# Patient Record
Sex: MALE | Race: WHITE | Employment: OTHER | URBAN - METROPOLITAN AREA
[De-identification: names, ages, dates, MRNs, and addresses within clinical notes are randomized per-mention and may not be internally consistent; named-entity substitution may affect disease eponyms.]

---

## 2017-01-03 ENCOUNTER — ANTI-COAG VISIT (OUTPATIENT)
Dept: PHARMACY | Facility: CLINIC | Age: 82
End: 2017-01-03

## 2017-01-03 LAB — INR BLD: 2.8

## 2017-02-03 ENCOUNTER — ANTI-COAG VISIT (OUTPATIENT)
Dept: PHARMACY | Facility: CLINIC | Age: 82
End: 2017-02-03

## 2017-02-03 LAB — INR BLD: 2.3

## 2017-03-07 ENCOUNTER — ANTI-COAG VISIT (OUTPATIENT)
Dept: PHARMACY | Facility: CLINIC | Age: 82
End: 2017-03-07

## 2017-03-07 LAB — INR BLD: 3

## 2017-04-04 ENCOUNTER — ANTI-COAG VISIT (OUTPATIENT)
Dept: PHARMACY | Facility: CLINIC | Age: 82
End: 2017-04-04

## 2017-04-04 LAB — INR BLD: 3.3

## 2017-04-24 ENCOUNTER — TELEPHONE (OUTPATIENT)
Dept: CARDIOLOGY CLINIC | Age: 82
End: 2017-04-24

## 2017-04-24 RX ORDER — WARFARIN SODIUM 4 MG/1
TABLET ORAL
Qty: 125 TABLET | Refills: 1 | Status: SHIPPED | OUTPATIENT
Start: 2017-04-24 | End: 2017-08-14 | Stop reason: SDUPTHER

## 2017-05-01 ENCOUNTER — ANTI-COAG VISIT (OUTPATIENT)
Dept: PHARMACY | Facility: CLINIC | Age: 82
End: 2017-05-01

## 2017-05-01 LAB — INR BLD: 2.3

## 2017-06-01 ENCOUNTER — ANTI-COAG VISIT (OUTPATIENT)
Dept: PHARMACY | Facility: CLINIC | Age: 82
End: 2017-06-01

## 2017-06-01 LAB — INR BLD: 2.9

## 2017-06-19 ENCOUNTER — PROCEDURE VISIT (OUTPATIENT)
Dept: CARDIOLOGY CLINIC | Age: 82
End: 2017-06-19

## 2017-06-19 ENCOUNTER — OFFICE VISIT (OUTPATIENT)
Dept: CARDIOLOGY CLINIC | Age: 82
End: 2017-06-19

## 2017-06-19 VITALS
SYSTOLIC BLOOD PRESSURE: 136 MMHG | HEART RATE: 68 BPM | BODY MASS INDEX: 27.7 KG/M2 | HEIGHT: 69 IN | OXYGEN SATURATION: 97 % | WEIGHT: 187 LBS | DIASTOLIC BLOOD PRESSURE: 78 MMHG

## 2017-06-19 DIAGNOSIS — I25.10 ATHEROSCLEROSIS OF NATIVE CORONARY ARTERY OF NATIVE HEART WITHOUT ANGINA PECTORIS: ICD-10-CM

## 2017-06-19 DIAGNOSIS — Z95.0 CARDIAC PACEMAKER IN SITU: ICD-10-CM

## 2017-06-19 DIAGNOSIS — E78.2 MIXED HYPERLIPIDEMIA: ICD-10-CM

## 2017-06-19 DIAGNOSIS — I49.5 SSS (SICK SINUS SYNDROME) (HCC): ICD-10-CM

## 2017-06-19 DIAGNOSIS — I48.0 PAROXYSMAL ATRIAL FIBRILLATION (HCC): Primary | ICD-10-CM

## 2017-06-19 DIAGNOSIS — I44.30 AVB (ATRIOVENTRICULAR BLOCK): ICD-10-CM

## 2017-06-19 DIAGNOSIS — I10 ESSENTIAL HYPERTENSION, BENIGN: ICD-10-CM

## 2017-06-19 DIAGNOSIS — R53.82 CHRONIC FATIGUE: ICD-10-CM

## 2017-06-19 DIAGNOSIS — Z95.0 CARDIAC PACEMAKER IN SITU: Primary | ICD-10-CM

## 2017-06-19 PROCEDURE — G8419 CALC BMI OUT NRM PARAM NOF/U: HCPCS | Performed by: INTERNAL MEDICINE

## 2017-06-19 PROCEDURE — G8427 DOCREV CUR MEDS BY ELIG CLIN: HCPCS | Performed by: INTERNAL MEDICINE

## 2017-06-19 PROCEDURE — 93279 PRGRMG DEV EVAL PM/LDLS PM: CPT | Performed by: INTERNAL MEDICINE

## 2017-06-19 PROCEDURE — 4040F PNEUMOC VAC/ADMIN/RCVD: CPT | Performed by: INTERNAL MEDICINE

## 2017-06-19 PROCEDURE — G8598 ASA/ANTIPLAT THER USED: HCPCS | Performed by: INTERNAL MEDICINE

## 2017-06-19 PROCEDURE — 1123F ACP DISCUSS/DSCN MKR DOCD: CPT | Performed by: INTERNAL MEDICINE

## 2017-06-19 PROCEDURE — 99214 OFFICE O/P EST MOD 30 MIN: CPT | Performed by: INTERNAL MEDICINE

## 2017-06-19 PROCEDURE — 1036F TOBACCO NON-USER: CPT | Performed by: INTERNAL MEDICINE

## 2017-06-19 RX ORDER — CALCIUM CARBONATE 500(1250)
500 TABLET ORAL DAILY
COMMUNITY

## 2017-06-22 DIAGNOSIS — R53.82 CHRONIC FATIGUE: ICD-10-CM

## 2017-06-22 DIAGNOSIS — E78.2 MIXED HYPERLIPIDEMIA: ICD-10-CM

## 2017-06-22 DIAGNOSIS — I48.0 PAROXYSMAL ATRIAL FIBRILLATION (HCC): ICD-10-CM

## 2017-06-22 DIAGNOSIS — I10 ESSENTIAL HYPERTENSION, BENIGN: ICD-10-CM

## 2017-06-22 LAB
A/G RATIO: 1.2 (ref 1.1–2.2)
ALBUMIN SERPL-MCNC: 4.1 G/DL (ref 3.4–5)
ALP BLD-CCNC: 147 U/L (ref 40–129)
ALT SERPL-CCNC: 10 U/L (ref 10–40)
ANION GAP SERPL CALCULATED.3IONS-SCNC: 11 MMOL/L (ref 3–16)
AST SERPL-CCNC: 18 U/L (ref 15–37)
BILIRUB SERPL-MCNC: 0.6 MG/DL (ref 0–1)
BUN BLDV-MCNC: 31 MG/DL (ref 7–20)
CALCIUM SERPL-MCNC: 9.2 MG/DL (ref 8.3–10.6)
CHLORIDE BLD-SCNC: 103 MMOL/L (ref 99–110)
CHOLESTEROL, TOTAL: 133 MG/DL (ref 0–199)
CO2: 26 MMOL/L (ref 21–32)
CREAT SERPL-MCNC: 1.2 MG/DL (ref 0.8–1.3)
GFR AFRICAN AMERICAN: >60
GFR NON-AFRICAN AMERICAN: 56
GLOBULIN: 3.3 G/DL
GLUCOSE BLD-MCNC: 89 MG/DL (ref 70–99)
HCT VFR BLD CALC: 43 % (ref 40.5–52.5)
HDLC SERPL-MCNC: 46 MG/DL (ref 40–60)
HEMOGLOBIN: 13.9 G/DL (ref 13.5–17.5)
LDL CHOLESTEROL CALCULATED: 75 MG/DL
MCH RBC QN AUTO: 30.9 PG (ref 26–34)
MCHC RBC AUTO-ENTMCNC: 32.4 G/DL (ref 31–36)
MCV RBC AUTO: 95.3 FL (ref 80–100)
PDW BLD-RTO: 16.1 % (ref 12.4–15.4)
PLATELET # BLD: 170 K/UL (ref 135–450)
PMV BLD AUTO: 8.5 FL (ref 5–10.5)
POTASSIUM SERPL-SCNC: 4.9 MMOL/L (ref 3.5–5.1)
RBC # BLD: 4.52 M/UL (ref 4.2–5.9)
SODIUM BLD-SCNC: 140 MMOL/L (ref 136–145)
TOTAL PROTEIN: 7.4 G/DL (ref 6.4–8.2)
TRIGL SERPL-MCNC: 58 MG/DL (ref 0–150)
TSH REFLEX: 1.71 UIU/ML (ref 0.27–4.2)
VLDLC SERPL CALC-MCNC: 12 MG/DL
WBC # BLD: 6 K/UL (ref 4–11)

## 2017-07-07 ENCOUNTER — TELEPHONE (OUTPATIENT)
Dept: CARDIOLOGY CLINIC | Age: 82
End: 2017-07-07

## 2017-07-07 ENCOUNTER — ANTI-COAG VISIT (OUTPATIENT)
Dept: PHARMACY | Facility: CLINIC | Age: 82
End: 2017-07-07

## 2017-07-07 LAB — INR BLD: 2.4

## 2017-07-19 ENCOUNTER — OFFICE VISIT (OUTPATIENT)
Dept: CARDIOLOGY CLINIC | Age: 82
End: 2017-07-19

## 2017-07-19 VITALS
HEART RATE: 100 BPM | BODY MASS INDEX: 27.85 KG/M2 | WEIGHT: 188 LBS | SYSTOLIC BLOOD PRESSURE: 110 MMHG | DIASTOLIC BLOOD PRESSURE: 54 MMHG | OXYGEN SATURATION: 98 % | HEIGHT: 69 IN

## 2017-07-19 DIAGNOSIS — I25.10 ATHEROSCLEROSIS OF NATIVE CORONARY ARTERY OF NATIVE HEART WITHOUT ANGINA PECTORIS: ICD-10-CM

## 2017-07-19 DIAGNOSIS — I48.3 TYPICAL ATRIAL FLUTTER (HCC): ICD-10-CM

## 2017-07-19 DIAGNOSIS — I48.0 PAROXYSMAL ATRIAL FIBRILLATION (HCC): Primary | ICD-10-CM

## 2017-07-19 DIAGNOSIS — Z95.0 CARDIAC PACEMAKER IN SITU: ICD-10-CM

## 2017-07-19 DIAGNOSIS — I10 ESSENTIAL HYPERTENSION, BENIGN: ICD-10-CM

## 2017-07-19 DIAGNOSIS — E78.5 HYPERLIPIDEMIA LDL GOAL <100: ICD-10-CM

## 2017-07-19 PROCEDURE — 1036F TOBACCO NON-USER: CPT | Performed by: INTERNAL MEDICINE

## 2017-07-19 PROCEDURE — 93000 ELECTROCARDIOGRAM COMPLETE: CPT | Performed by: INTERNAL MEDICINE

## 2017-07-19 PROCEDURE — 4040F PNEUMOC VAC/ADMIN/RCVD: CPT | Performed by: INTERNAL MEDICINE

## 2017-07-19 PROCEDURE — G8598 ASA/ANTIPLAT THER USED: HCPCS | Performed by: INTERNAL MEDICINE

## 2017-07-19 PROCEDURE — G8419 CALC BMI OUT NRM PARAM NOF/U: HCPCS | Performed by: INTERNAL MEDICINE

## 2017-07-19 PROCEDURE — G8427 DOCREV CUR MEDS BY ELIG CLIN: HCPCS | Performed by: INTERNAL MEDICINE

## 2017-07-19 PROCEDURE — 99214 OFFICE O/P EST MOD 30 MIN: CPT | Performed by: INTERNAL MEDICINE

## 2017-07-19 PROCEDURE — 1123F ACP DISCUSS/DSCN MKR DOCD: CPT | Performed by: INTERNAL MEDICINE

## 2017-08-14 RX ORDER — SIMVASTATIN 20 MG
TABLET ORAL
Qty: 90 TABLET | Refills: 3 | Status: SHIPPED | OUTPATIENT
Start: 2017-08-14 | End: 2018-11-07 | Stop reason: SDUPTHER

## 2017-08-14 RX ORDER — WARFARIN SODIUM 4 MG/1
TABLET ORAL
Qty: 125 TABLET | Refills: 1 | Status: SHIPPED | OUTPATIENT
Start: 2017-08-14 | End: 2018-03-07 | Stop reason: SDUPTHER

## 2017-08-14 RX ORDER — LABETALOL 100 MG/1
50 TABLET, FILM COATED ORAL 2 TIMES DAILY
Qty: 90 TABLET | Refills: 3 | Status: SHIPPED | OUTPATIENT
Start: 2017-08-14 | End: 2018-10-17 | Stop reason: SDUPTHER

## 2017-08-15 ENCOUNTER — ANTI-COAG VISIT (OUTPATIENT)
Dept: PHARMACY | Facility: CLINIC | Age: 82
End: 2017-08-15

## 2017-08-15 LAB — INR BLD: 2.3

## 2017-09-05 ENCOUNTER — ANTI-COAG VISIT (OUTPATIENT)
Dept: PHARMACY | Facility: CLINIC | Age: 82
End: 2017-09-05

## 2017-09-05 LAB — INR BLD: 2.4

## 2017-10-04 ENCOUNTER — ANTI-COAG VISIT (OUTPATIENT)
Dept: PHARMACY | Facility: CLINIC | Age: 82
End: 2017-10-04

## 2017-10-04 LAB — INR BLD: 2.2

## 2017-10-04 NOTE — MR AVS SNAPSHOT
After Visit Summary             Justyn Carlin   10/4/2017 1:00 PM   Anti-coag visit    Description:  Male : 1920   Provider:  Eulalio MEI   Department:  [facility]              Your Follow-Up and Future Appointments         Below is a list of your follow-up and future appointments. This may not be a complete list as you may have made appointments directly with providers that we are not aware of or your providers may have made some for you. Please call your providers to confirm appointments. It is important to keep your appointments. Please bring your current insurance card, photo ID, co-pay, and all medication bottles to your appointment. If self-pay, payment is expected at the time of service. Your To-Do List     Future Appointments Provider Department Dept Phone    10/17/2017 8:45 AM MONICA Bunn [facility] [address] [phone]    2017 1:15 PM [address] [place] [address] [facility] [phone]         Information from Your Visit        Department     Name Address Phone Fax    [facility] [address] [place] [address] [phone]      Anticoagulation Summary as of 10/4/2017              Today's INR 2.2    Next INR check 2017      Description           Warfarin 4 mg daily    Greens such as Spinach, broccoli, asparagus contain Vitamin K which will cause INR to be LOW. Cranberries/cranberry juice and grapefruit/grapefruit juice will cause INR to be HIGH. Call with any new antibiotic prescription. Okay to take warfarin in the morning to help to remember. Avoid cranberries. Keep Vitamin K consistent in diet. Salads 4 times per week.   Spinach, Broccoli or asparagus on occasion      Vital Signs     Smoking Status                   Never Smoker For questions regarding your Octrot account call [phone]. If you have a clinical question, please call your doctor's office. October 2017 Details    Sun Mon Tue Wed Thu Fri Sat     1               2               3               4      4 mg   See details      5      4 mg         6      4 mg         7      4 mg           8      4 mg         9      4 mg         10      4 mg         11      4 mg         12      4 mg         13      4 mg         14      4 mg           15      4 mg         16      4 mg         17      4 mg         18      4 mg         19      4 mg         20      4 mg         21      4 mg           22      4 mg         23      4 mg         24      4 mg         25      4 mg         26      4 mg         27      4 mg         28      4 mg           29      4 mg         30      4 mg         31      4 mg              Date Details   10/04 This INR check               How to take your warfarin dose              To take:  4 mg Take 1 of the 4 mg tablets. November 2017 Details    Sun Mon Tue Wed Thu Fri Sat        1      4 mg         2               3               4                 5               6               7               8               9               10               11                 12               13               14               15               16               17               18                 19               20               21               22               23               24               25                 26               27               28               29               30                  Date Details   No additional details    Date of next INR:  11/1/2017         How to take your warfarin dose              To take:  4 mg Take 1 of the 4 mg tablets.

## 2017-11-01 ENCOUNTER — HOSPITAL ENCOUNTER (OUTPATIENT)
Dept: OTHER | Age: 82
Discharge: HOME | End: 2017-11-30
Attending: INTERNAL MEDICINE | Admitting: INTERNAL MEDICINE

## 2017-11-01 ENCOUNTER — ANTI-COAG VISIT (OUTPATIENT)
Dept: PHARMACY | Facility: CLINIC | Age: 82
End: 2017-11-01

## 2017-11-01 LAB — INR BLD: 3.1

## 2017-11-17 ENCOUNTER — ANTI-COAG VISIT (OUTPATIENT)
Dept: PHARMACY | Facility: CLINIC | Age: 82
End: 2017-11-17

## 2017-11-17 LAB — INR BLD: 2.4

## 2017-11-17 NOTE — PROGRESS NOTES
Mr. Nii Rice is a 80 y.o.  male with history of Afib who presents today for anticoagulation monitoring and adjustment. Patient verifies current dosing regimen. Patient denies s/s bleeding/bruising/swelling/SOB. No blood in urine or stool. No dietary changes. No changes in medication/OTC agents/Herbals. No change in alcohol use. No missed doses. No Procedures scheduled in the future at this time. Lab Results   Component Value Date    INR 2.4 11/17/2017    INR 3.1 11/01/2017    INR 2.2 10/04/2017       Patient appears well. No changes, no problems. Coumadin dose: Continue Warfarin 4 mg daily    Recheck INR in 4 weeks. Patient reminded to call the Anticoagulation Clinic with any medication changes. Patient given instructions utilizing the teach back method. After visit summary printed and reviewed with patient. Medications reviewed and Warfarin dose updated.

## 2017-12-01 ENCOUNTER — HOSPITAL ENCOUNTER (OUTPATIENT)
Dept: OTHER | Age: 82
Discharge: HOME | End: 2017-12-31
Attending: INTERNAL MEDICINE | Admitting: INTERNAL MEDICINE

## 2017-12-15 ENCOUNTER — ANTI-COAG VISIT (OUTPATIENT)
Dept: PHARMACY | Facility: CLINIC | Age: 82
End: 2017-12-15

## 2017-12-15 LAB — INTERNATIONAL NORMALIZATION RATIO, POC: 2.7

## 2017-12-15 NOTE — PROGRESS NOTES
Mr. Kevin Duarte is a 80 y.o.  male with history of Afib who presents today for anticoagulation monitoring and adjustment. Patient verifies current dosing regimen. Patient denies s/s bleeding/bruising/swelling/SOB. No blood in urine or stool. No dietary changes. No changes in medication/OTC agents/Herbals. No change in alcohol use. No missed doses. No Procedures scheduled in the future at this time. Lab Results   Component Value Date    INR 2.7 12/15/2017    INR 2.4 11/17/2017    INR 3.1 11/01/2017       Patient appears well. No changes, no problems. Coumadin dose: Continue Warfarin 4 mg daily    Recheck INR in 4 weeks. Patient reminded to call the Anticoagulation Clinic with any medication changes. Patient given instructions utilizing the teach back method. After visit summary printed and reviewed with patient. Medications reviewed and Warfarin dose updated.

## 2018-01-01 ENCOUNTER — HOSPITAL ENCOUNTER (OUTPATIENT)
Dept: OTHER | Age: 83
Discharge: HOME | End: 2018-01-31
Attending: INTERNAL MEDICINE | Admitting: INTERNAL MEDICINE

## 2018-01-09 ENCOUNTER — ANTI-COAG VISIT (OUTPATIENT)
Dept: PHARMACY | Facility: CLINIC | Age: 83
End: 2018-01-09

## 2018-01-09 LAB — INR BLD: 2.7

## 2018-02-01 ENCOUNTER — HOSPITAL ENCOUNTER (OUTPATIENT)
Dept: OTHER | Age: 83
Discharge: HOME | End: 2018-02-28
Attending: INTERNAL MEDICINE | Admitting: INTERNAL MEDICINE

## 2018-02-02 ENCOUNTER — TELEPHONE (OUTPATIENT)
Dept: PHARMACY | Facility: CLINIC | Age: 83
End: 2018-02-02

## 2018-02-02 NOTE — TELEPHONE ENCOUNTER
----- Message from Logan Chi LPN sent at 2/7/4771 10:50 AM EST -----  Patient called to report startint Prednisone please call him at [phone]

## 2018-02-02 NOTE — TELEPHONE ENCOUNTER
I called and spoke with  Los Rutledge. He started the prednisone yesterday and will be taking for 10 days total .. Aníbal Vasquez Aníbal Ovalle 4 tabs daily x 4 days, 3 tabs daily x 3 days, 2 tabs daily x 2 days, 1 tab daily x 1 day. Patient has already taken Warfarin 4mg yesterday and today. I instructed patient to take warfarin 2mg on Saturday, Sunday, Monday and Tuesday. He has an appointment in the Coumadin Clinic on Wednesday. I told him to NOT take his warfarin that before his appointment.

## 2018-02-07 ENCOUNTER — ANTI-COAG VISIT (OUTPATIENT)
Dept: PHARMACY | Facility: CLINIC | Age: 83
End: 2018-02-07

## 2018-02-07 ENCOUNTER — PROCEDURE VISIT (OUTPATIENT)
Dept: CARDIOLOGY CLINIC | Age: 83
End: 2018-02-07

## 2018-02-07 ENCOUNTER — OFFICE VISIT (OUTPATIENT)
Dept: CARDIOLOGY CLINIC | Age: 83
End: 2018-02-07

## 2018-02-07 VITALS
BODY MASS INDEX: 29.33 KG/M2 | DIASTOLIC BLOOD PRESSURE: 68 MMHG | HEART RATE: 68 BPM | HEIGHT: 69 IN | OXYGEN SATURATION: 97 % | WEIGHT: 198 LBS | SYSTOLIC BLOOD PRESSURE: 108 MMHG

## 2018-02-07 DIAGNOSIS — I48.0 PAROXYSMAL ATRIAL FIBRILLATION (HCC): ICD-10-CM

## 2018-02-07 DIAGNOSIS — Z95.0 CARDIAC PACEMAKER IN SITU: ICD-10-CM

## 2018-02-07 DIAGNOSIS — I10 ESSENTIAL HYPERTENSION, BENIGN: ICD-10-CM

## 2018-02-07 DIAGNOSIS — E78.5 HYPERLIPIDEMIA LDL GOAL <100: ICD-10-CM

## 2018-02-07 DIAGNOSIS — I25.10 ATHEROSCLEROSIS OF NATIVE CORONARY ARTERY OF NATIVE HEART WITHOUT ANGINA PECTORIS: ICD-10-CM

## 2018-02-07 DIAGNOSIS — I44.30 AVB (ATRIOVENTRICULAR BLOCK): ICD-10-CM

## 2018-02-07 DIAGNOSIS — Z95.0 CARDIAC PACEMAKER IN SITU: Primary | ICD-10-CM

## 2018-02-07 LAB — INR BLD: 1.7

## 2018-02-07 PROCEDURE — G8598 ASA/ANTIPLAT THER USED: HCPCS | Performed by: INTERNAL MEDICINE

## 2018-02-07 PROCEDURE — G8427 DOCREV CUR MEDS BY ELIG CLIN: HCPCS | Performed by: INTERNAL MEDICINE

## 2018-02-07 PROCEDURE — 93279 PRGRMG DEV EVAL PM/LDLS PM: CPT | Performed by: INTERNAL MEDICINE

## 2018-02-07 PROCEDURE — G8419 CALC BMI OUT NRM PARAM NOF/U: HCPCS | Performed by: INTERNAL MEDICINE

## 2018-02-07 PROCEDURE — 1036F TOBACCO NON-USER: CPT | Performed by: INTERNAL MEDICINE

## 2018-02-07 PROCEDURE — 4040F PNEUMOC VAC/ADMIN/RCVD: CPT | Performed by: INTERNAL MEDICINE

## 2018-02-07 PROCEDURE — G8482 FLU IMMUNIZE ORDER/ADMIN: HCPCS | Performed by: INTERNAL MEDICINE

## 2018-02-07 PROCEDURE — 99214 OFFICE O/P EST MOD 30 MIN: CPT | Performed by: INTERNAL MEDICINE

## 2018-02-07 PROCEDURE — 1123F ACP DISCUSS/DSCN MKR DOCD: CPT | Performed by: INTERNAL MEDICINE

## 2018-02-07 NOTE — PATIENT INSTRUCTIONS
· You have symptoms of a stroke. These may include:  ¨ Sudden numbness, tingling, weakness, or loss of movement in your face, arm, or leg, especially on only one side of your body. ¨ Sudden vision changes. ¨ Sudden trouble speaking. ¨ Sudden confusion or trouble understanding simple statements. ¨ Sudden problems with walking or balance. ¨ A sudden, severe headache that is different from past headaches. ? · You passed out (lost consciousness). ?Call your doctor now or seek immediate medical care if:  ? · You have new or increased shortness of breath. ? · You feel dizzy or lightheaded, or you feel like you may faint. ? · Your heart rate becomes irregular. ? · You can feel your heart flutter in your chest or skip heartbeats. Tell your doctor if these symptoms are new or worse. ? Watch closely for changes in your health, and be sure to contact your doctor if you have any problems. Where can you learn more? Go to https://DreamFace Interactive.Refinder by Gnowsis. org and sign in to your Tradegecko account. Enter U020 in the Cenzic box to learn more about \"Atrial Fibrillation: Care Instructions. \"     If you do not have an account, please click on the \"Sign Up Now\" link. Current as of: September 21, 2016  Content Version: 11.5  © 5961-8681 Healthwise, Incorporated. Care instructions adapted under license by [facility] ([facility]). If you have questions about a medical condition or this instruction, always ask your healthcare professional. [place] [address] any warranty or liability for your use of this information.

## 2018-02-07 NOTE — PROGRESS NOTES
[facility]   Electrophysiology   Date: 2/7/2018     I had the privilege of visiting Marge Jade in the office. CC:Leg Pain    HPI: Marge Jade is a 80 y. o.with a PMH significant for CAD (S/p cypher stent mid LAD 2007), HLD, HTN, chronic afib/flutter s/p DCCV (11/2010) and PPM (1993) for SSS. He presents today for a follow up of device management and AFIB/Flutter. He presents in a wheelchair with family at his side. Device interrogation today shows AFIB with RVR. Patient on Warfarin and Labetalol. He has no cardiac complaints today. Says he has some right leg pain due to sciatica. He is currently taking Prednisone. He denies lightheadedness, dizziness, chest pain, palpitations, orthopnea, edema, presyncope or syncope. No major bleeding issues with coumadin therapy. Past Medical History:   Diagnosis Date    Atrial fibrillation ([address]     CAD (coronary artery disease)     Depression     HIGH CHOLESTEROL     Hypertension     Pacemaker     Spinal stenosis of lumbar region         Past Surgical History:   Procedure Laterality Date    CORONARY ANGIOPLASTY WITH STENT PLACEMENT      PACEMAKER PLACEMENT         Allergies:  No Known Allergies    Medication:  Prior to Admission medications    Medication Sig Start Date End Date Taking? Authorizing Provider   labetalol (NORMODYNE) 100 MG tablet Take 0.5 tablets by mouth 2 times daily 8/14/17   Taylor Naik MD   simvastatin (ZOCOR) 20 MG tablet TAKE 1 TABLET NIGHTLY 8/14/17   Taylor Naik MD   warfarin (COUMADIN) 4 MG tablet Take 1 tablet daily as directed. 8/14/17   Taylor Naik MD   calcium carbonate (OSCAL) 500 MG TABS tablet Take 500 mg by mouth daily    Historical Provider, MD   tamsulosin (FLOMAX) 0.4 MG capsule Take 0.4 mg by mouth daily    Historical Provider, MD   Multiple Vitamin (MULTI-VITAMIN PO) Take  by mouth daily. Historical Provider, MD   duloxetine (CYMBALTA) 30 MG capsule Take 30 mg by mouth daily.       Historical Provider, MD       Social History:  Reviewed. reports that he has never smoked. He has never used smokeless tobacco. He reports that he drinks alcohol. He reports that he does not use drugs. Family History:  Reviewed. family history includes Diabetes in his mother; Heart Disease in his father; Other in his mother. Denies family history of sudden cardiac death, arrhythmia, premature CAD    Review of System:    · General ROS: negative for - chills, fever   · Psychological ROS: negative for - anxiety or depression  · Ophthalmic ROS: negative for - eye pain or loss of vision  · ENT ROS: negative for - headaches, sore throat   · Allergy and Immunology ROS: negative for - hives  · Hematological and Lymphatic ROS: negative for - bleeding problems, blood clots, bruising or jaundice  · Endocrine ROS: negative for - skin changes, temperature intolerance or unexpected weight changes  · Respiratory ROS: negative for - cough, sputum, wheezing  · Cardiovascular ROS: Per HPI. · Gastrointestinal ROS: negative for - abdominal pain, diarrhea, nausea/vomiting, bleeding   · Genito-Urinary ROS: negative for - dysuria or incontinence  · Musculoskeletal ROS: negative for - joint swelling   · Neurological ROS: negative for - confusion, numbness/tingling, seizures, weakness  · Dermatological ROS: negative for - rash    Physical Examination:  Vitals:    02/07/18 1042   BP: 108/68   Pulse: 68   SpO2: 97%       · Constitutional: Oriented. No distress. · Head: Normocephalic and atraumatic. · Mouth/Throat: Oropharynx is clear and moist.   · Eyes: Conjunctivae normal. EOM are normal.   · Neck: Normal range of motion. Neck supple. No rigidity. No JVD present. · Cardiovascular: Normal rate, regular rhythm, S1&S2 and intact distal pulses. · Pulmonary/Chest: Bilateral respiratory sounds. No wheezes. No rhonchi. · Abdominal: Soft. Bowel sounds present. No distension, No tenderness. · Musculoskeletal: No tenderness.  No edema

## 2018-02-07 NOTE — PROGRESS NOTES
Mr. Nishant Posadas is a 80 y.o.  male with history of Afib who presents today for anticoagulation monitoring and adjustment. Patient verifies current dosing regimen  Patient denies s/s bleeding/bruising/swelling/SOB  No blood in urine or stool. No dietary changes. No change in alcohol use. No missed doses. No Procedures scheduled in the future at this time. Lab Results   Component Value Date    INR 1.70 02/07/2018    INR 2.7 01/09/2018    INR 2.7 12/15/2017       Patient appears well. He has been taking 1/2 his dose for the past 6 days because he is on prednisone currently. No diet changes or signs of bleeding/bruising. INR was low today. Patient is going to take 1/2 his dose again today and then go back to his normal dosing regimen. After visit summary printed and reviewed with patient. Medications reviewed and updated on home medication list: Yes.  Patients Cymbalta was increased to 60 mg daily  Warfarin dose updated on patient home medication list: Yes

## 2018-02-23 ENCOUNTER — ANTI-COAG VISIT (OUTPATIENT)
Dept: PHARMACY | Facility: CLINIC | Age: 83
End: 2018-02-23

## 2018-02-23 LAB — INR BLD: 3.3

## 2018-03-01 ENCOUNTER — HOSPITAL ENCOUNTER (OUTPATIENT)
Dept: OTHER | Age: 83
Discharge: HOME | End: 2018-03-31
Attending: INTERNAL MEDICINE | Admitting: INTERNAL MEDICINE

## 2018-03-07 ENCOUNTER — TELEPHONE (OUTPATIENT)
Dept: CARDIOLOGY CLINIC | Age: 83
End: 2018-03-07

## 2018-03-07 RX ORDER — WARFARIN SODIUM 4 MG/1
TABLET ORAL
Qty: 125 TABLET | Refills: 1 | Status: SHIPPED | OUTPATIENT
Start: 2018-03-07 | End: 2018-03-07 | Stop reason: CLARIF

## 2018-03-07 RX ORDER — WARFARIN SODIUM 4 MG/1
TABLET ORAL
Qty: 125 TABLET | Refills: 1 | Status: SHIPPED | OUTPATIENT
Start: 2018-03-07 | End: 2018-11-07 | Stop reason: SDUPTHER

## 2018-03-07 NOTE — TELEPHONE ENCOUNTER
Medication Refill    When was your last appointment with cardiology?  (if 1year or longer, please schedule an appointment)    Medication needing refilled: Warfarin     Doseage of the medication: 4 mg     How are you taking this medication (QD, BID, TID, QID, PRN): Take 1 tablet daily as directed.     Patient want a 30 or 90 day supply called in: 90 day     Which Pharmacy are we sending the medication to:    [facility], [address] [facility] [phone] - F [phone]

## 2018-03-16 ENCOUNTER — ANTI-COAG VISIT (OUTPATIENT)
Dept: PHARMACY | Facility: CLINIC | Age: 83
End: 2018-03-16

## 2018-03-16 LAB — INTERNATIONAL NORMALIZATION RATIO, POC: 2.9

## 2018-04-01 ENCOUNTER — HOSPITAL ENCOUNTER (OUTPATIENT)
Dept: OTHER | Age: 83
Discharge: HOME | End: 2018-04-30
Attending: INTERNAL MEDICINE | Admitting: INTERNAL MEDICINE

## 2018-04-13 ENCOUNTER — ANTI-COAG VISIT (OUTPATIENT)
Dept: PHARMACY | Facility: CLINIC | Age: 83
End: 2018-04-13

## 2018-04-13 LAB — INTERNATIONAL NORMALIZATION RATIO, POC: 3

## 2018-05-01 ENCOUNTER — HOSPITAL ENCOUNTER (OUTPATIENT)
Dept: OTHER | Age: 83
Discharge: HOME | End: 2018-05-31
Attending: INTERNAL MEDICINE | Admitting: INTERNAL MEDICINE

## 2018-05-11 ENCOUNTER — ANTI-COAG VISIT (OUTPATIENT)
Dept: PHARMACY | Facility: CLINIC | Age: 83
End: 2018-05-11

## 2018-05-11 LAB — INR BLD: 3

## 2018-05-16 ENCOUNTER — TELEPHONE (OUTPATIENT)
Dept: PHARMACY | Age: 83
End: 2018-05-16

## 2018-05-17 ENCOUNTER — SCHEDULED TELEPHONE ENCOUNTER (OUTPATIENT)
Dept: PHARMACY | Facility: CLINIC | Age: 83
End: 2018-05-17

## 2018-06-01 ENCOUNTER — HOSPITAL ENCOUNTER (OUTPATIENT)
Dept: OTHER | Age: 83
Discharge: HOME | End: 2018-06-30
Attending: INTERNAL MEDICINE | Admitting: INTERNAL MEDICINE

## 2018-06-08 ENCOUNTER — ANTI-COAG VISIT (OUTPATIENT)
Dept: PHARMACY | Facility: CLINIC | Age: 83
End: 2018-06-08

## 2018-06-08 LAB — INR BLD: 2.7

## 2018-07-01 ENCOUNTER — HOSPITAL ENCOUNTER (OUTPATIENT)
Dept: OTHER | Age: 83
Discharge: HOME | End: 2018-07-31
Attending: INTERNAL MEDICINE | Admitting: INTERNAL MEDICINE

## 2018-07-06 ENCOUNTER — ANTI-COAG VISIT (OUTPATIENT)
Dept: PHARMACY | Facility: CLINIC | Age: 83
End: 2018-07-06

## 2018-07-06 LAB — INR BLD: 2.4

## 2018-07-09 ENCOUNTER — PROCEDURE VISIT (OUTPATIENT)
Dept: CARDIOLOGY CLINIC | Age: 83
End: 2018-07-09

## 2018-07-09 ENCOUNTER — OFFICE VISIT (OUTPATIENT)
Dept: CARDIOLOGY CLINIC | Age: 83
End: 2018-07-09

## 2018-07-09 VITALS
DIASTOLIC BLOOD PRESSURE: 74 MMHG | WEIGHT: 190 LBS | SYSTOLIC BLOOD PRESSURE: 120 MMHG | BODY MASS INDEX: 28.14 KG/M2 | HEIGHT: 69 IN | OXYGEN SATURATION: 97 %

## 2018-07-09 DIAGNOSIS — I48.20 CHRONIC ATRIAL FIBRILLATION (HCC): Primary | ICD-10-CM

## 2018-07-09 DIAGNOSIS — Z95.0 CARDIAC PACEMAKER IN SITU: ICD-10-CM

## 2018-07-09 DIAGNOSIS — I44.30 AVB (ATRIOVENTRICULAR BLOCK): ICD-10-CM

## 2018-07-09 DIAGNOSIS — I48.3 TYPICAL ATRIAL FLUTTER (HCC): ICD-10-CM

## 2018-07-09 DIAGNOSIS — I10 ESSENTIAL HYPERTENSION, BENIGN: ICD-10-CM

## 2018-07-09 DIAGNOSIS — E78.2 MIXED HYPERLIPIDEMIA: ICD-10-CM

## 2018-07-09 DIAGNOSIS — I49.5 SSS (SICK SINUS SYNDROME) (HCC): ICD-10-CM

## 2018-07-09 PROCEDURE — 4040F PNEUMOC VAC/ADMIN/RCVD: CPT | Performed by: INTERNAL MEDICINE

## 2018-07-09 PROCEDURE — 1036F TOBACCO NON-USER: CPT | Performed by: INTERNAL MEDICINE

## 2018-07-09 PROCEDURE — 99214 OFFICE O/P EST MOD 30 MIN: CPT | Performed by: INTERNAL MEDICINE

## 2018-07-09 PROCEDURE — G8598 ASA/ANTIPLAT THER USED: HCPCS | Performed by: INTERNAL MEDICINE

## 2018-07-09 PROCEDURE — 1123F ACP DISCUSS/DSCN MKR DOCD: CPT | Performed by: INTERNAL MEDICINE

## 2018-07-09 PROCEDURE — 93279 PRGRMG DEV EVAL PM/LDLS PM: CPT | Performed by: INTERNAL MEDICINE

## 2018-07-09 PROCEDURE — G8417 CALC BMI ABV UP PARAM F/U: HCPCS | Performed by: INTERNAL MEDICINE

## 2018-07-09 PROCEDURE — G8427 DOCREV CUR MEDS BY ELIG CLIN: HCPCS | Performed by: INTERNAL MEDICINE

## 2018-07-09 NOTE — PROGRESS NOTES
· Abdominal: Soft. Bowel sounds present. No distension, No tenderness. · Musculoskeletal: No tenderness. No edema    · Lymphadenopathy: Has no cervical adenopathy. · Neurological: Alert and oriented. Cranial nerve appears intact, No Gross deficit   · Skin: Skin is warm and dry. No rash noted. · Psychiatric: Has a normal mood, affect and behavior       Labs:  Reviewed. ECG: reviewed, 7/19/17 Afib/ flutter    Echo: 11/4/10  1. Mild pulmonary hypertension. 2. Mitral, tricuspid and pulmonic regurgitation. 3. Normal left ventricular size and systolic function.     Cath: 9/6/07  Single vessel coronary artery disease with high grade mid LAD 99% lesion. Normal left ventricular size and systolic function. Normal hemodynamics. Assessment:   Patient Active Problem List    Diagnosis Date Noted    Atrial fibrillation ([facility] 08/12/2015    AVB (atrioventricular block)     Cardiac pacemaker in situ 04/23/2012    Sinoatrial node dysfunction ([address] 04/23/2012    Other and unspecified hyperlipidemia 10/31/2011    Essential hypertension, benign 10/31/2011    Coronary atherosclerosis of native coronary artery 10/31/2011        Plan:  1) Atrial fibrillation/ flutter: Chronic. Asymptomatic. Continue BB. CHADS Vasc score 4 (age, HTN, CAD)-- continue coumadin.    2) SSS: S/p PPM in situ. Device interrogated today and functioning appropriately.  3.8%. 3) CAD:  No angina. Hx of stent to mid LAD 2007. Continue BB and statin. Not on ASA due to being on coumadin therapy.    4)  Hypertension:  Well controlled. Goal <150/90. Continue current medications.    5)  Hyperlipidemia:  Continue simvastatin.      Follow up in 6 months.      I have discussed the plan of care with the primary care team.    Tushar Reyna MD, PhD

## 2018-07-09 NOTE — PROGRESS NOTES
Patient comes in for programming evaluation for his pacemaker. The device is functioning within normal parameters. No changes need to be made at this time. HVR episodes recorded. Known AF - on Xarelto. Please see interrogation for more detail. Dr. Kidd Grew to review interrogation. Patient is to see Dr. Dahlia Moreno in office today also. Patient will follow up in 3 months in office or remotely.

## 2018-07-09 NOTE — PATIENT INSTRUCTIONS
· You have symptoms of a stroke. These may include:  ¨ Sudden numbness, tingling, weakness, or loss of movement in your face, arm, or leg, especially on only one side of your body. ¨ Sudden vision changes. ¨ Sudden trouble speaking. ¨ Sudden confusion or trouble understanding simple statements. ¨ Sudden problems with walking or balance. ¨ A sudden, severe headache that is different from past headaches.     · You passed out (lost consciousness).    Call your doctor now or seek immediate medical care if:    · You have new or increased shortness of breath.     · You feel dizzy or lightheaded, or you feel like you may faint.     · Your heart rate becomes irregular.     · You can feel your heart flutter in your chest or skip heartbeats. Tell your doctor if these symptoms are new or worse.    Watch closely for changes in your health, and be sure to contact your doctor if you have any problems. Where can you learn more? Go to https://DLVR Therapeutics.Wasabi Productions. org and sign in to your AbilTo account. Enter U020 in the [employer] box to learn more about \"Atrial Fibrillation: Care Instructions. \"     If you do not have an account, please click on the \"Sign Up Now\" link. Current as of: December 6, 2017  Content Version: 11.6  © 9042-0130 CipherCloud, Incorporated. Care instructions adapted under license by [facility] ([facility]). If you have questions about a medical condition or this instruction, always ask your healthcare professional. [place] [address] any warranty or liability for your use of this information.

## 2018-07-27 ENCOUNTER — OFFICE VISIT (OUTPATIENT)
Dept: CARDIOLOGY CLINIC | Age: 83
End: 2018-07-27

## 2018-07-27 VITALS
WEIGHT: 191 LBS | DIASTOLIC BLOOD PRESSURE: 68 MMHG | BODY MASS INDEX: 28.29 KG/M2 | SYSTOLIC BLOOD PRESSURE: 136 MMHG | OXYGEN SATURATION: 96 % | HEART RATE: 88 BPM | HEIGHT: 69 IN

## 2018-07-27 DIAGNOSIS — Z95.0 PACEMAKER: ICD-10-CM

## 2018-07-27 DIAGNOSIS — I48.91 ATRIAL FIBRILLATION, UNSPECIFIED TYPE (HCC): Primary | ICD-10-CM

## 2018-07-27 DIAGNOSIS — I25.10 CORONARY ARTERY DISEASE INVOLVING NATIVE CORONARY ARTERY OF NATIVE HEART WITHOUT ANGINA PECTORIS: ICD-10-CM

## 2018-07-27 DIAGNOSIS — E78.2 MIXED HYPERLIPIDEMIA: ICD-10-CM

## 2018-07-27 DIAGNOSIS — I10 ESSENTIAL HYPERTENSION: ICD-10-CM

## 2018-07-27 PROCEDURE — G8427 DOCREV CUR MEDS BY ELIG CLIN: HCPCS | Performed by: INTERNAL MEDICINE

## 2018-07-27 PROCEDURE — 1101F PT FALLS ASSESS-DOCD LE1/YR: CPT | Performed by: INTERNAL MEDICINE

## 2018-07-27 PROCEDURE — G8417 CALC BMI ABV UP PARAM F/U: HCPCS | Performed by: INTERNAL MEDICINE

## 2018-07-27 PROCEDURE — 4040F PNEUMOC VAC/ADMIN/RCVD: CPT | Performed by: INTERNAL MEDICINE

## 2018-07-27 PROCEDURE — 99214 OFFICE O/P EST MOD 30 MIN: CPT | Performed by: INTERNAL MEDICINE

## 2018-07-27 PROCEDURE — 1036F TOBACCO NON-USER: CPT | Performed by: INTERNAL MEDICINE

## 2018-07-27 PROCEDURE — 1123F ACP DISCUSS/DSCN MKR DOCD: CPT | Performed by: INTERNAL MEDICINE

## 2018-07-27 PROCEDURE — 93000 ELECTROCARDIOGRAM COMPLETE: CPT | Performed by: INTERNAL MEDICINE

## 2018-07-27 PROCEDURE — G8598 ASA/ANTIPLAT THER USED: HCPCS | Performed by: INTERNAL MEDICINE

## 2018-07-27 NOTE — PATIENT INSTRUCTIONS
Patient Education        Coronary Artery Disease: Care Instructions  Your Care Instructions    The heart is a muscle, and like any muscle, it needs blood to work well. Coronary artery disease occurs when the arteries that bring oxygen-rich blood to your heart have a buildup of plaque-deposits of fats and other substances. Plaque can reduce blood flow to the heart muscle. This can cause angina symptoms such as chest pain or pressure. A heart attack can happen if blood flow is completely blocked. You can do a lot to improve your health and prevent a heart attack. Eating healthy food, not smoking, getting regular exercise, and taking your medicine are the main things you can do every day to stay healthy. Follow-up care is a key part of your treatment and safety. Be sure to make and go to all appointments, and call your doctor if you are having problems. It's also a good idea to know your test results and keep a list of the medicines you take. How can you care for yourself at home? Medicines    · Be safe with medicines. Take your medicines exactly as prescribed. Call your doctor if you think you are having a problem with your medicine. You will get more details on the specific medicines your doctor prescribes. You may need several medicines. ¨ Angiotensin-converting enzyme (ACE) inhibitors, angiotensin II receptor blockers (ARBs), beta-blockers, and statins can help prevent a heart attack. ACE inhibitors, ARBs, and beta-blockers help lower your blood pressure. Statins help lower cholesterol, which is a type of fat that can clog your arteries. ¨ Nitrates can help make chest pain happen less often. ¨ Aspirin and other blood thinners help prevent heart attacks and strokes.     · If your doctor has given you nitroglycerin for angina symptoms (such as chest pain or pressure) keep it with you at all times. If you have symptoms, sit down and rest, and take the first dose of nitroglycerin as directed.  If your symptoms get worse or are not getting better within 5 minutes, call 911 right away. Stay on the phone. The emergency  will give you further instructions.     · Be sure to tell your doctor about any angina symptoms that you have had, even if they went away.     · Do not take any over-the-counter medicines, vitamins, or herbal products without talking to your doctor first.   Eduar Collins your doctor if a cardiac rehab program is right for you. Cardiac rehab can help you make lifestyle changes. In cardiac rehab, a team of health professionals provides education and support to help you make new, healthy habits.    · Do not smoke. Avoid secondhand smoke too. Smoking can increase your risk of a heart attack or stroke. If you need help quitting, talk to your doctor about stop-smoking programs and medicines. These can increase your chances of quitting for good.     · Eat a heart-healthy diet that is high in fiber and low in saturated fat and sodium. ¨ Learn what a serving is. A \"serving\" and a \"portion\" are not always the same thing. Make sure that you are not eating larger portions than recommended. For example, a serving of pasta is ½ cup. A serving size of meat is 2 to 3 ounces; a 3-ounce serving is about the size of a deck of cards. ¨ Eat a variety of grain products every day. Include whole-grain foods such as oats, whole wheat bread, and brown rice. ¨ Eat fish, skinless poultry, lean meats, and soy products such as tofu instead of high-fat meats. Cut out all visible fat when you prepare meat. Eat at least 2 servings of fish a week. ¨ Eat a variety of fruit and vegetables every day. They have lots of nutrients that help protect against heart disease, and they have little-if any-fat. Keep carrots, celery, and other veggies handy for snacks. Buy fruit that is in season and store it where you can see it so that you will be tempted to eat it.  Cook dishes that have a lot of veggies in them, such as stir-fried dishes

## 2018-07-27 NOTE — PROGRESS NOTES
[facility]   Office Progress Note        Chief Complaint   Patient presents with    Atrial Fibrillation    Shortness of Breath     with exertion. FLORENTIN LEGERILLY     HPI:  Patient with a history of CAD (S/p Cypher stent mid LAD 2007). Had chest pains at that time; Also has, HLD, HTN, Chronic controlled AFIB/Flutter, CV (11/2010) and PPM for SSS in 1993. Patient is here for f/u of AFlutter and CAD. He is here with his wife today and they continue to live independently in their condo. He says he has been well. He wears bilateral hearing aids. He says he is able to walk with his cane and works in his garden. He does report some SOB/[place], which is not limiting his daily activity. He specifically denies any chest pain, palpitations or dizziness. Continues to take Warfarin. He reports compliance with medication and has no abnormal bruising or bleeding. Physical Examination:       /68 (Site: Right Arm, Position: Sitting)   Pulse 88   Ht 5' 9\" (1.753 m)   Wt 191 lb (86.6 kg)   SpO2 96%   BMI 28.21 kg/m²    HEENT:  Face: Atraumatic, Conjunctiva: Pink; non icteric,  Mucous Memb:  Moist, No thyromegaly or Lymphadenopathy  Respiratory:  Resp Assessment: normal, Resp Auscultation: clear   Cardiovascular: Auscultation: nl S1 & S2, Palpation:  Nl PMI; No heaves or thrills, JVP:  normal  Abdomen: Soft, non-tender, Normal bowel sounds,  No organomegaly  Extremities: No Cyanosis or Clubbing; Edema none  Neurological: Oriented to time, place, and person, Non-anxious  Psychiatric: Normal mood and affect  Skin: Warm and dry,  No rash seen    Outpatient Prescriptions Marked as Taking for the 7/27/18 encounter (Office Visit) with Goldy Briceno MD   Medication Sig Dispense Refill    warfarin (COUMADIN) 4 MG tablet Take 1 tablet daily as directed.  125 tablet 1    labetalol (NORMODYNE) 100 MG tablet Take 0.5 tablets by mouth 2 times daily 90 tablet 3    simvastatin (ZOCOR) 20 MG tablet TAKE 1 TABLET

## 2018-08-01 ENCOUNTER — HOSPITAL ENCOUNTER (OUTPATIENT)
Dept: OTHER | Age: 83
Discharge: HOME | End: 2018-08-31
Attending: INTERNAL MEDICINE | Admitting: INTERNAL MEDICINE

## 2018-08-03 ENCOUNTER — ANTI-COAG VISIT (OUTPATIENT)
Dept: PHARMACY | Facility: CLINIC | Age: 83
End: 2018-08-03

## 2018-08-03 LAB — INTERNATIONAL NORMALIZATION RATIO, POC: 2.8

## 2018-08-03 NOTE — PROGRESS NOTES
Mr. Charly Estrada is a 80 y.o.  male with history of Afib who presents today for anticoagulation monitoring and adjustment. Patient verifies current dosing regimen. Patient denies s/s bleeding/bruising/swelling/SOB. No blood in urine or stool. No dietary changes. No changes in medication/OTC agents/Herbals. No change in alcohol use. No missed doses. No Procedures scheduled in the future at this time. Lab Results   Component Value Date    INR 2.8 08/03/2018    INR 2.40 07/06/2018    INR 2.70 06/08/2018       Patient appears well. No changes, no problems. Coumadin Dose : Continue Warfarin 4 mg daily    Return in 4 weeks. Patient reminded to call the Anticoagulation Clinic with any medication changes. Patient given instructions utilizing the teach back method. After visit summary printed and reviewed with patient.

## 2018-08-22 ENCOUNTER — TELEPHONE (OUTPATIENT)
Dept: CARDIOLOGY CLINIC | Age: 83
End: 2018-08-22

## 2018-08-22 NOTE — TELEPHONE ENCOUNTER
Hold Coumadin for 5 days.  If patient has recurrent episodes of fall, have patient see Dr. Duy Urbina sooner than the scheduled appointment to see if patient can be considered for [facility]

## 2018-08-22 NOTE — TELEPHONE ENCOUNTER
Dr Luli Edge,    Please advice in the absence of Dr. Garo Wang. Patient with hx of:   CAD (S/p Cypher stent mid LAD 2007). Had chest pains at that time; Also has, HLD, HTN, Chronic controlled AFIB/Flutter, CV (11/2010) and PPM for SSS in 1993. Most recent INR 8/3/18  2.8     Patient fell and is bleeding and PCP would like to hold Coumadin. Please advise how long patient can hold Coumadin.

## 2018-08-22 NOTE — TELEPHONE ENCOUNTER
Called/spoke to pt. Informed of recs. He will let us know how he is doing and if he needs to make an appointment.

## 2018-08-31 ENCOUNTER — ANTI-COAG VISIT (OUTPATIENT)
Dept: PHARMACY | Facility: CLINIC | Age: 83
End: 2018-08-31

## 2018-08-31 LAB — INTERNATIONAL NORMALIZATION RATIO, POC: 1.3

## 2018-08-31 NOTE — PROGRESS NOTES
Mr. Ashley Gerardo is a 80 y.o.  male with history of Afib who presents today for anticoagulation monitoring and adjustment. Patient verifies current dosing regimen    No blood in urine or stool. No dietary changes. No changes in medication/OTC agents/Herbals. No change in alcohol use. No Procedures scheduled in the future at this time. Lab Results   Component Value Date    INR 1.3 08/31/2018    INR 2.8 08/03/2018    INR 2.40 07/06/2018       Pertinent findings: patient was off for 5 days d/t a cut in arm that would not stop bleeding,  Resumed 4mg qd 3 days ago  Also on day 3 medrol dosepak    Warfarin dosing: Continue Warfarin 4 mg daily  See in 4 weeks since patient very stable with this dose. After visit summary printed and reviewed with patient.       Medications reviewed and updated on home medication list: Yes  Warfarin dose updated on patient home medication list: Yes

## 2018-09-01 ENCOUNTER — HOSPITAL ENCOUNTER (OUTPATIENT)
Dept: OTHER | Age: 83
Discharge: HOME | End: 2018-09-01
Attending: INTERNAL MEDICINE | Admitting: INTERNAL MEDICINE

## 2018-09-26 ENCOUNTER — ANTI-COAG VISIT (OUTPATIENT)
Dept: PHARMACY | Age: 83
End: 2018-09-26
Payer: MEDICARE

## 2018-09-26 LAB — INTERNATIONAL NORMALIZATION RATIO, POC: 1.7

## 2018-09-26 PROCEDURE — 99211 OFF/OP EST MAY X REQ PHY/QHP: CPT

## 2018-09-26 PROCEDURE — 85610 PROTHROMBIN TIME: CPT

## 2018-10-18 RX ORDER — LABETALOL 100 MG/1
TABLET, FILM COATED ORAL
Qty: 90 TABLET | Refills: 3 | Status: SHIPPED | OUTPATIENT
Start: 2018-10-18 | End: 2019-10-13 | Stop reason: SDUPTHER

## 2018-10-24 ENCOUNTER — ANTI-COAG VISIT (OUTPATIENT)
Dept: PHARMACY | Age: 83
End: 2018-10-24
Payer: MEDICARE

## 2018-10-24 LAB — INTERNATIONAL NORMALIZATION RATIO, POC: 3

## 2018-10-24 PROCEDURE — 99211 OFF/OP EST MAY X REQ PHY/QHP: CPT

## 2018-10-24 PROCEDURE — 85610 PROTHROMBIN TIME: CPT

## 2018-11-09 RX ORDER — SIMVASTATIN 20 MG
TABLET ORAL
Qty: 90 TABLET | Refills: 3 | Status: SHIPPED | OUTPATIENT
Start: 2018-11-09 | End: 2020-01-13

## 2018-11-09 RX ORDER — WARFARIN SODIUM 4 MG/1
TABLET ORAL
Qty: 90 TABLET | Refills: 3 | Status: SHIPPED | OUTPATIENT
Start: 2018-11-09 | End: 2019-10-13 | Stop reason: SDUPTHER

## 2018-11-26 ENCOUNTER — ANTI-COAG VISIT (OUTPATIENT)
Dept: PHARMACY | Age: 83
End: 2018-11-26
Payer: MEDICARE

## 2018-11-26 LAB — INTERNATIONAL NORMALIZATION RATIO, POC: 2.5

## 2018-11-26 PROCEDURE — 85610 PROTHROMBIN TIME: CPT

## 2018-11-26 PROCEDURE — 99211 OFF/OP EST MAY X REQ PHY/QHP: CPT

## 2018-12-06 ENCOUNTER — TELEPHONE (OUTPATIENT)
Dept: CARDIOLOGY CLINIC | Age: 83
End: 2018-12-06

## 2018-12-07 RX ORDER — FUROSEMIDE 20 MG/1
40 TABLET ORAL DAILY
COMMUNITY
End: 2019-02-04 | Stop reason: SDUPTHER

## 2018-12-13 ENCOUNTER — OFFICE VISIT (OUTPATIENT)
Dept: CARDIOLOGY CLINIC | Age: 83
End: 2018-12-13
Payer: MEDICARE

## 2018-12-13 VITALS
BODY MASS INDEX: 27.7 KG/M2 | OXYGEN SATURATION: 96 % | DIASTOLIC BLOOD PRESSURE: 72 MMHG | HEART RATE: 86 BPM | HEIGHT: 69 IN | WEIGHT: 187 LBS | SYSTOLIC BLOOD PRESSURE: 128 MMHG

## 2018-12-13 DIAGNOSIS — I50.32 CHRONIC DIASTOLIC HEART FAILURE (HCC): ICD-10-CM

## 2018-12-13 DIAGNOSIS — I25.10 CORONARY ARTERY DISEASE INVOLVING NATIVE CORONARY ARTERY OF NATIVE HEART WITHOUT ANGINA PECTORIS: ICD-10-CM

## 2018-12-13 DIAGNOSIS — I48.0 PAROXYSMAL ATRIAL FIBRILLATION (HCC): Primary | ICD-10-CM

## 2018-12-13 DIAGNOSIS — E78.2 MIXED HYPERLIPIDEMIA: ICD-10-CM

## 2018-12-13 DIAGNOSIS — I10 ESSENTIAL HYPERTENSION: ICD-10-CM

## 2018-12-13 DIAGNOSIS — R06.02 SOB (SHORTNESS OF BREATH): ICD-10-CM

## 2018-12-13 PROCEDURE — G8598 ASA/ANTIPLAT THER USED: HCPCS | Performed by: INTERNAL MEDICINE

## 2018-12-13 PROCEDURE — 93000 ELECTROCARDIOGRAM COMPLETE: CPT | Performed by: INTERNAL MEDICINE

## 2018-12-13 PROCEDURE — 1123F ACP DISCUSS/DSCN MKR DOCD: CPT | Performed by: INTERNAL MEDICINE

## 2018-12-13 PROCEDURE — 1036F TOBACCO NON-USER: CPT | Performed by: INTERNAL MEDICINE

## 2018-12-13 PROCEDURE — 99214 OFFICE O/P EST MOD 30 MIN: CPT | Performed by: INTERNAL MEDICINE

## 2018-12-13 PROCEDURE — G8427 DOCREV CUR MEDS BY ELIG CLIN: HCPCS | Performed by: INTERNAL MEDICINE

## 2018-12-13 PROCEDURE — 4040F PNEUMOC VAC/ADMIN/RCVD: CPT | Performed by: INTERNAL MEDICINE

## 2018-12-13 PROCEDURE — G8417 CALC BMI ABV UP PARAM F/U: HCPCS | Performed by: INTERNAL MEDICINE

## 2018-12-13 PROCEDURE — G8484 FLU IMMUNIZE NO ADMIN: HCPCS | Performed by: INTERNAL MEDICINE

## 2018-12-13 PROCEDURE — 1101F PT FALLS ASSESS-DOCD LE1/YR: CPT | Performed by: INTERNAL MEDICINE

## 2018-12-13 NOTE — PROGRESS NOTES
[facility]   Office Progress Note        Chief Complaint   Patient presents with    Check-Up     CHF, CAD, HTN, HLD; Here with wife and daughter; INR managed by [address] [facility]    Other     No CP or palpitations    Dizziness     at times    Edema     feet and ankles    Fatigue     tires easily    Shortness of Breath     with exertion        FLORENTIN BUENO     HPI:  Patient with a history of CAD (S/p Cypher stent mid LAD 2007). Had chest pains at that time; Also has, HLD, HTN, Chronic controlled AFIB/Flutter, CV (11/2010) and PPM for SSS in 1993. Patient is here for f/u of AFlutter and CAD. He is here with his wife and daughter today. He is concerned about his shortness of breath. He says that his shortness of breath is worse when he bends over to tie his shoes. His daughter noticed that he was wheezing a few weeks ago. He specifically denies any chest pain, palpitations or dizziness. Continues to take Warfarin. He reports compliance with medication and has no abnormal bruising or bleeding. Physical Examination:       /72   Pulse 86 Comment: irreg  Ht 5' 9\" (1.753 m)   Wt 187 lb (84.8 kg) Comment: too unsteady to remove shoes  SpO2 96%   BMI 27.62 kg/m²    HEENT:  Face: Atraumatic, Conjunctiva: Pink; non icteric,  Mucous Memb:  Moist, No thyromegaly or Lymphadenopathy  Respiratory:  Resp Assessment: normal, Resp Auscultation: clear   Cardiovascular: Auscultation: nl S1 & S2, Palpation:  Nl PMI;  No heaves or thrills, JVP:  normal  Abdomen: Soft, non-tender, Normal bowel sounds,  No organomegaly  Extremities: No Cyanosis or Clubbing; Edema none  Neurological: Oriented to time, place, and person, Non-anxious  Psychiatric: Normal mood and affect  Skin: Warm and dry,  No rash seen    Outpatient Prescriptions Marked as Taking for the 12/13/18 encounter (Office Visit) with Gabe Quintana MD   Medication Sig Dispense Refill    furosemide (LASIX) 20 MG tablet Take 20 mg by mouth daily      simvastatin (ZOCOR) 20 MG tablet TAKE 1 TABLET NIGHTLY 90 tablet 3    warfarin (COUMADIN) 4 MG tablet TAKE 1 TABLET DAILY AS DIRECTED 90 tablet 3    labetalol (NORMODYNE) 100 MG tablet TAKE ONE-HALF (1/2) TABLET TWICE A DAY 90 tablet 3    calcium carbonate (OSCAL) 500 MG TABS tablet Take 500 mg by mouth daily      tamsulosin (FLOMAX) 0.4 MG capsule Take 0.4 mg by mouth daily      Multiple Vitamin (MULTI-VITAMIN PO) Take  by mouth daily.  duloxetine (CYMBALTA) 30 MG capsule Take 60 mg by mouth daily          Lab Results   Component Value Date    HGB 13.9 2017    HCT 43.0 2017     Lab Results   Component Value Date     2017     Lab Results   Component Value Date    K 4.9 2017     Lab Results   Component Value Date    BUN 31 2017    CREATININE 1.2 2017       EC16 Atrial Flutter with PVC's and LBBB  2017 Atrial Fib-Flutter, HR 93  18 Atrial flutter    Echo: 11/4/10  1. Mild pulmonary hypertension. 2. Mitral, tricuspid and pulmonic regurgitation. 3. Normal left ventricular size and systolic function. Cath: 07  Single vessel coronary artery disease with high grade mid LAD 99% lesion. Normal left ventricular size and systolic function. Normal hemodynamics. Assessment/Plan:    Shortness of breath/Diastolic Heart failure  He was started on Lasix by Dr Adelina Jarvis of breath has slightly improved with Lasix  He may have extra Lasix PRN wheezing. May consider increaseing dose to 40 mg daily if wheezing does not improve. He is to take Lasix in afternoon. Low sodium diet/flud restriction    Atrial flutter:   EKG today show atrial fibrillation  Chronic Atrial Flutter. EKG today confirms Atrial Flutter  CHDS Vasc is at least 4 (AGE, HTN, CAD). On Warfarin and B-Blocker. INR 2.50 (18), managed by [facility]    CAD. No angina.    S/p stent  Continue risk factor modification and medical therapy    Essential

## 2018-12-13 NOTE — PATIENT INSTRUCTIONS
Patient Education        Shortness of Breath: Care Instructions  Your Care Instructions  Shortness of breath has many causes. Sometimes conditions such as anxiety can lead to shortness of breath. Some people get mild shortness of breath when they exercise. Trouble breathing also can be a symptom of a serious problem, such as asthma, lung disease, emphysema, heart problems, and pneumonia. If your shortness of breath continues, you may need tests and treatment. Watch for any changes in your breathing and other symptoms. Follow-up care is a key part of your treatment and safety. Be sure to make and go to all appointments, and call your doctor if you are having problems. It's also a good idea to know your test results and keep a list of the medicines you take. How can you care for yourself at home? · Do not smoke or allow others to smoke around you. If you need help quitting, talk to your doctor about stop-smoking programs and medicines. These can increase your chances of quitting for good. · Get plenty of rest and sleep. · Take your medicines exactly as prescribed. Call your doctor if you think you are having a problem with your medicine. · Find healthy ways to deal with stress. ? Exercise daily. ? Get plenty of sleep. ? Eat regularly and well. When should you call for help? Call 911 anytime you think you may need emergency care. For example, call if:    · You have severe shortness of breath.     · You have symptoms of a heart attack. These may include:  ? Chest pain or pressure, or a strange feeling in the chest.  ? Sweating. ? Shortness of breath. ? Nausea or vomiting. ? Pain, pressure, or a strange feeling in the back, neck, jaw, or upper belly or in one or both shoulders or arms. ? Lightheadedness or sudden weakness. ? A fast or irregular heartbeat. After you call 911, the  may tell you to chew 1 adult-strength or 2 to 4 low-dose aspirin. Wait for an ambulance.  Do not try to drive you add to your food. Follow-up care is a key part of your treatment and safety. Be sure to make and go to all appointments, and call your doctor if you are having problems. It's also a good idea to know your test results and keep a list of the medicines you take. How can you care for yourself at home? Read food labels  · Read labels on cans and food packages. The labels tell you how much sodium is in each serving. Make sure that you look at the serving size. If you eat more than the serving size, you have eaten more sodium. · Food labels also tell you the Percent Daily Value for sodium. Choose products with low Percent Daily Values for sodium. · Be aware that sodium can come in forms other than salt, including monosodium glutamate (MSG), sodium citrate, and sodium bicarbonate (baking soda). MSG is often added to Asian food. When you eat out, you can sometimes ask for food without MSG or added salt. Buy low-sodium foods  · Buy foods that are labeled \"unsalted\" (no salt added), \"sodium-free\" (less than 5 mg of sodium per serving), or \"low-sodium\" (less than 140 mg of sodium per serving). Foods labeled \"reduced-sodium\" and \"light sodium\" may still have too much sodium. Be sure to read the label to see how much sodium you are getting. · Buy fresh vegetables, or frozen vegetables without added sauces. Buy low-sodium versions of canned vegetables, soups, and other canned goods. Prepare low-sodium meals  · Cut back on the amount of salt you use in cooking. This will help you adjust to the taste. Do not add salt after cooking. One teaspoon of salt has about 2,300 mg of sodium. · Take the salt shaker off the table. · Flavor your food with garlic, lemon juice, onion, vinegar, herbs, and spices. Do not use soy sauce, lite soy sauce, steak sauce, onion salt, garlic salt, celery salt, mustard, or ketchup on your food. · Use low-sodium salad dressings, sauces, and ketchup.  Or make your own salad dressings and sauces

## 2018-12-21 ENCOUNTER — TELEPHONE (OUTPATIENT)
Dept: CARDIOLOGY CLINIC | Age: 83
End: 2018-12-21

## 2019-01-02 ENCOUNTER — ANTI-COAG VISIT (OUTPATIENT)
Dept: PHARMACY | Age: 84
End: 2019-01-02
Payer: MEDICARE

## 2019-01-02 LAB — INTERNATIONAL NORMALIZATION RATIO, POC: 2.3

## 2019-01-02 PROCEDURE — 85610 PROTHROMBIN TIME: CPT

## 2019-01-02 PROCEDURE — 99211 OFF/OP EST MAY X REQ PHY/QHP: CPT

## 2019-02-04 ENCOUNTER — ANTI-COAG VISIT (OUTPATIENT)
Dept: PHARMACY | Age: 84
End: 2019-02-04
Payer: MEDICARE

## 2019-02-04 LAB — INTERNATIONAL NORMALIZATION RATIO, POC: 2.7

## 2019-02-04 PROCEDURE — 99211 OFF/OP EST MAY X REQ PHY/QHP: CPT

## 2019-02-04 PROCEDURE — 85610 PROTHROMBIN TIME: CPT

## 2019-02-04 RX ORDER — FUROSEMIDE 40 MG/1
40 TABLET ORAL DAILY
COMMUNITY
End: 2019-06-11 | Stop reason: DRUGHIGH

## 2019-02-25 ENCOUNTER — TELEPHONE (OUTPATIENT)
Dept: PHARMACY | Age: 84
End: 2019-02-25

## 2019-02-25 ENCOUNTER — TELEPHONE (OUTPATIENT)
Dept: CARDIOLOGY CLINIC | Age: 84
End: 2019-02-25

## 2019-03-04 ENCOUNTER — APPOINTMENT (OUTPATIENT)
Dept: PHARMACY | Age: 84
End: 2019-03-04
Payer: MEDICARE

## 2019-03-06 ENCOUNTER — APPOINTMENT (OUTPATIENT)
Dept: PHARMACY | Age: 84
End: 2019-03-06
Payer: MEDICARE

## 2019-03-06 ENCOUNTER — ANTI-COAG VISIT (OUTPATIENT)
Dept: PHARMACY | Age: 84
End: 2019-03-06
Payer: MEDICARE

## 2019-03-06 LAB — INTERNATIONAL NORMALIZATION RATIO, POC: 3

## 2019-03-06 PROCEDURE — 99211 OFF/OP EST MAY X REQ PHY/QHP: CPT

## 2019-03-06 PROCEDURE — 85610 PROTHROMBIN TIME: CPT

## 2019-04-03 ENCOUNTER — APPOINTMENT (OUTPATIENT)
Dept: PHARMACY | Age: 84
End: 2019-04-03
Payer: MEDICARE

## 2019-04-03 ENCOUNTER — ANTI-COAG VISIT (OUTPATIENT)
Dept: PHARMACY | Age: 84
End: 2019-04-03
Payer: MEDICARE

## 2019-04-03 LAB — INTERNATIONAL NORMALIZATION RATIO, POC: 2.7

## 2019-04-03 PROCEDURE — 99211 OFF/OP EST MAY X REQ PHY/QHP: CPT

## 2019-04-03 PROCEDURE — 85610 PROTHROMBIN TIME: CPT

## 2019-04-03 NOTE — PROGRESS NOTES
Mr. Parris Vidal is a 80 y.o.  male with history of Afib who presents today for anticoagulation monitoring and adjustment. Patient verifies current dosing regimen  Patient denies s/s bleeding/bruising/swelling/SOB  No blood in urine or stool. No dietary changes. No changes in medication/OTC agents/Herbals. No change in alcohol use. No missed doses. No Procedures scheduled in the future at this time. Lab Results   Component Value Date    INR 2.7 04/03/2019    INR 3.0 03/06/2019    INR 2.7 02/04/2019       Pertinent findings: Looks and feels well today. No changes in medications or diet. No bruising or bleeding noted. No change in dose for INR of  2. 7. We will  see in 4 weeks for next INR. Warfarin dosing: CONTINUE:  Warfarin 4 mg daily     After visit summary printed and reviewed with patient.

## 2019-05-01 ENCOUNTER — ANTI-COAG VISIT (OUTPATIENT)
Dept: PHARMACY | Age: 84
End: 2019-05-01
Payer: MEDICARE

## 2019-05-01 LAB — INTERNATIONAL NORMALIZATION RATIO, POC: 1.6

## 2019-05-01 PROCEDURE — 99211 OFF/OP EST MAY X REQ PHY/QHP: CPT

## 2019-05-01 PROCEDURE — 85610 PROTHROMBIN TIME: CPT

## 2019-05-01 NOTE — PROGRESS NOTES
Mr. Adrián Aguiar is a 80 y.o.  male with history of Afib who presents today for anticoagulation monitoring and adjustment. Patient verifies current dosing regimen  Patient denies s/s bleeding/bruising/swelling/SOB  No blood in urine or stool. No changes in medication/OTC agents/Herbals. No change in alcohol use. No missed doses. No Procedures scheduled in the future at this time. Lab Results   Component Value Date    INR 1.6 05/01/2019    INR 2.7 04/03/2019    INR 3.0 03/06/2019       Pertinent findings: He looks and feels well today. He says that he had a very large serving of spinach yesterday. This may be why his INR is low today at 1.6. He denies any missed doses of warfarin. We will have him take 6mg of warfarin today and then resume his previous dosing. His INR has been quite stable over the past few visits. He will return in 4 weeks for his next INR. Warfarin dosing: Take 6mg(1and 1/2 tablets) TODAY ONLY then CONTINUE:  Warfarin 4 mg daily     After visit summary printed and reviewed with patient.

## 2019-05-29 ENCOUNTER — ANTI-COAG VISIT (OUTPATIENT)
Dept: PHARMACY | Age: 84
End: 2019-05-29
Payer: MEDICARE

## 2019-05-29 LAB — INTERNATIONAL NORMALIZATION RATIO, POC: 2.6

## 2019-05-29 PROCEDURE — 99211 OFF/OP EST MAY X REQ PHY/QHP: CPT

## 2019-05-29 PROCEDURE — 85610 PROTHROMBIN TIME: CPT

## 2019-05-29 NOTE — PROGRESS NOTES
Mr. Kimberyl Rubio is a 80 y.o.  male with history of Afib who presents today for anticoagulation monitoring and adjustment. Patient verifies current dosing regimen  Patient denies s/s bleeding/bruising/swelling/SOB  No blood in urine or stool. No dietary changes. No changes in medication/OTC agents/Herbals. No change in alcohol use. No missed doses. No Procedures scheduled in the future at this time. Lab Results   Component Value Date    INR 2.6 05/29/2019    INR 1.6 05/01/2019    INR 2.7 04/03/2019       Pertinent findings: Looks and feels well today. No changes in medications or diet. No bruising or bleeding noted. No change in dose for INR of  2.6. Will see in 4 weeks for next INR. Warfarin dosing: CONTINUE:  Warfarin 4 mg daily    After visit summary printed and reviewed with patient.

## 2019-06-11 ENCOUNTER — NURSE ONLY (OUTPATIENT)
Dept: CARDIOLOGY CLINIC | Age: 84
End: 2019-06-11
Payer: MEDICARE

## 2019-06-11 ENCOUNTER — OFFICE VISIT (OUTPATIENT)
Dept: CARDIOLOGY CLINIC | Age: 84
End: 2019-06-11
Payer: MEDICARE

## 2019-06-11 VITALS
WEIGHT: 190 LBS | SYSTOLIC BLOOD PRESSURE: 112 MMHG | OXYGEN SATURATION: 95 % | HEIGHT: 69 IN | HEART RATE: 82 BPM | DIASTOLIC BLOOD PRESSURE: 64 MMHG | BODY MASS INDEX: 28.14 KG/M2

## 2019-06-11 DIAGNOSIS — Z95.0 CARDIAC PACEMAKER: ICD-10-CM

## 2019-06-11 DIAGNOSIS — I25.10 CORONARY ARTERY DISEASE INVOLVING NATIVE CORONARY ARTERY OF NATIVE HEART WITHOUT ANGINA PECTORIS: ICD-10-CM

## 2019-06-11 DIAGNOSIS — I44.30 AVB (ATRIOVENTRICULAR BLOCK): ICD-10-CM

## 2019-06-11 DIAGNOSIS — Z95.0 CARDIAC PACEMAKER IN SITU: Primary | ICD-10-CM

## 2019-06-11 DIAGNOSIS — I50.32 CHRONIC DIASTOLIC HEART FAILURE (HCC): Primary | ICD-10-CM

## 2019-06-11 DIAGNOSIS — E78.2 MIXED HYPERLIPIDEMIA: ICD-10-CM

## 2019-06-11 DIAGNOSIS — I48.92 CHRONIC ATRIAL FLUTTER (HCC): ICD-10-CM

## 2019-06-11 DIAGNOSIS — I10 ESSENTIAL HYPERTENSION: ICD-10-CM

## 2019-06-11 PROCEDURE — G8427 DOCREV CUR MEDS BY ELIG CLIN: HCPCS | Performed by: INTERNAL MEDICINE

## 2019-06-11 PROCEDURE — 4040F PNEUMOC VAC/ADMIN/RCVD: CPT | Performed by: INTERNAL MEDICINE

## 2019-06-11 PROCEDURE — G8598 ASA/ANTIPLAT THER USED: HCPCS | Performed by: INTERNAL MEDICINE

## 2019-06-11 PROCEDURE — 99213 OFFICE O/P EST LOW 20 MIN: CPT | Performed by: INTERNAL MEDICINE

## 2019-06-11 PROCEDURE — 1036F TOBACCO NON-USER: CPT | Performed by: INTERNAL MEDICINE

## 2019-06-11 PROCEDURE — G8417 CALC BMI ABV UP PARAM F/U: HCPCS | Performed by: INTERNAL MEDICINE

## 2019-06-11 PROCEDURE — 93279 PRGRMG DEV EVAL PM/LDLS PM: CPT | Performed by: INTERNAL MEDICINE

## 2019-06-11 PROCEDURE — 1123F ACP DISCUSS/DSCN MKR DOCD: CPT | Performed by: INTERNAL MEDICINE

## 2019-06-11 RX ORDER — FUROSEMIDE 40 MG/1
40 TABLET ORAL DAILY
Status: SHIPPED | COMMUNITY
Start: 2019-06-11

## 2019-06-11 NOTE — PATIENT INSTRUCTIONS
Patient Education        A Healthy Heart: Care Instructions  Your Care Instructions    Heart disease occurs when a substance called plaque builds up in the vessels that supply oxygen-rich blood to your heart. This can narrow the blood vessels and reduce blood flow. A heart attack happens when blood flow is completely blocked. A high-fat diet, smoking, and other factors increase the risk of heart disease. Your doctor has found that you have a chance of having heart disease. You can do lots of things to keep your heart healthy. It may not be easy, but you can change your diet, exercise more, and quit smoking. These steps really work to lower your chance of heart disease. Follow-up care is a key part of your treatment and safety. Be sure to make and go to all appointments, and call your doctor if you are having problems. It's also a good idea to know your test results and keep a list of the medicines you take. How can you care for yourself at home? Diet    · Use less salt when you cook and eat. This helps lower your blood pressure. Taste food before salting. Add only a little salt when you think you need it. With time, your taste buds will adjust to less salt.     · Eat fewer snack items, fast foods, canned soups, and other high-salt, high-fat, processed foods.     · Read food labels and try to avoid saturated and trans fats. They increase your risk of heart disease by raising cholesterol levels.     · Limit the amount of solid fat-butter, margarine, and shortening-you eat. Use olive, peanut, or canola oil when you cook. Bake, broil, and steam foods instead of frying them.     · Eating fish can lower your risk for heart disease. Eat at least 2 servings of fish a week. [place], mackerel, herring, sardines, and chunk light tuna are very good choices. These fish contain omega-3 fatty acids.     · Eat a variety of fruit and vegetables every day.  Dark green, deep orange, red, or yellow fruits and vegetables are the  may tell you to chew 1 adult-strength or 2 to 4 low-dose aspirin. Wait for an ambulance. Do not try to drive yourself.   Watch closely for changes in your health, and be sure to contact your doctor if you have any problems. Where can you learn more? Go to [email or web address] org and sign in to your Google account. Enter T882 in the Xhale box to learn more about \"A Healthy Heart: Care Instructions. \"     If you do not have an account, please click on the \"Sign Up Now\" link. Current as of: July 22, 2018  Content Version: 12.0  © 3644-7232 [employer], [employer]orporated. Care instructions adapted under license by [facility] ([facility]). If you have questions about a medical condition or this instruction, always ask your healthcare professional. [address] any warranty or liability for your use of this information.

## 2019-06-11 NOTE — PROGRESS NOTES
Patient comes in for device interrogation today. Interrogation shows normal device function. All testing was within normal parameters. No new episodes recorded. Patient is seeing Dr. Loly Camargo today in office, also. Will f/u in 3-6 months in office (no remote). Dr. Mancia Memory to review interrogation. See interrogation/Paceart report for further details.

## 2019-06-11 NOTE — PROGRESS NOTES
tablet 3    calcium carbonate (OSCAL) 500 MG TABS tablet Take 500 mg by mouth daily      tamsulosin (FLOMAX) 0.4 MG capsule Take 0.4 mg by mouth daily      Multiple Vitamin (MULTI-VITAMIN PO) Take  by mouth daily.  duloxetine (CYMBALTA) 30 MG capsule Take 60 mg by mouth daily          Lab Results   Component Value Date    HGB 13.9 2017    HCT 43.0 2017     Lab Results   Component Value Date     2017     Lab Results   Component Value Date    K 4.9 2017     Lab Results   Component Value Date    BUN 31 2017    CREATININE 1.2 2017       EC19 Device Interrogated today      Echo: 11/4/10  1. Mild pulmonary hypertension. 2. Mitral, tricuspid and pulmonic regurgitation. 3. Normal left ventricular size and systolic function. Cath: 07  Single vessel coronary artery disease with high grade mid LAD 99% lesion. Normal left ventricular size and systolic function. Normal hemodynamics. Assessment/Plan:    Diastolic Heart failure  Has occasional shortness of breath  Continue Lasix and B-blocker   Again, may take an additional dose of Lasix as needed for worsening breathing  Maintain fluid and sodium restriction  (creatinine ranges between 1.3-1.7)    NYHA Class II    Atrial flutter:   Device interrogation today  CHDS Vasc is at least 4 (AGE, HTN, CAD). On Warfarin and B-Blocker. INR 2.6 (19)    CAD. No angina. S/p stent  Continue risk factor modification and medical therapy    Essential hypertension:   Controlled     Mixed Hyperlipidemia:   Continue statin therapy    Cardiac pacemaker in situ:   Normal device interrogation today   Known AF, on Warfarin     Follow up in 1 year      Thank you very much for allowing me to participate in the care of your patient. Please do not hesitate to contact me if you have any questions.       Sincerely,    Ronaldo Agustin M.D  [facility], [address] [place] [id number]  : [phone]  Fax: [phone]    This note was scribed in the presence of Dr. Jan José MD by [facility]    Physician Attestation:  The scribes documentation has been prepared under my direction and personally reviewed by me in its entirety. I confirm that the note above accurately reflects all work, treatment, procedures, and medical decision making performed by me.

## 2019-06-26 ENCOUNTER — ANTI-COAG VISIT (OUTPATIENT)
Dept: PHARMACY | Age: 84
End: 2019-06-26
Payer: MEDICARE

## 2019-06-26 LAB — INTERNATIONAL NORMALIZATION RATIO, POC: 2.5

## 2019-06-26 PROCEDURE — 99211 OFF/OP EST MAY X REQ PHY/QHP: CPT

## 2019-06-26 PROCEDURE — 85610 PROTHROMBIN TIME: CPT

## 2019-06-26 NOTE — PROGRESS NOTES
Mr. Corrie Redd is a 80 y.o.  male with history of Afib who presents today for anticoagulation monitoring and adjustment. Patient verifies current dosing regimen  Patient denies s/s bleeding/bruising/swelling/SOB  No blood in urine or stool. No dietary changes. No changes in medication/OTC agents/Herbals. No change in alcohol use. No missed doses. No Procedures scheduled in the future at this time. Lab Results   Component Value Date    INR 2.5 06/26/2019    INR 2.6 05/29/2019    INR 1.6 05/01/2019       Pertinent findings: Patient appears very well. He states that he recently ate spinach and collards, but it was not reflected in his INR of 2.5 today. We will see him back in 5 weeks. Warfarin dosing:   CONTINUE:  Warfarin 4 mg daily    After visit summary printed and reviewed with patient.       Medications reviewed and updated on home medication list: Yes, patient denied any changes  Warfarin dose updated on patient home medication list: Yes

## 2019-07-31 ENCOUNTER — ANTI-COAG VISIT (OUTPATIENT)
Dept: PHARMACY | Age: 84
End: 2019-07-31
Payer: MEDICARE

## 2019-07-31 LAB — INTERNATIONAL NORMALIZATION RATIO, POC: 4.3

## 2019-07-31 PROCEDURE — 85610 PROTHROMBIN TIME: CPT

## 2019-07-31 PROCEDURE — 99211 OFF/OP EST MAY X REQ PHY/QHP: CPT

## 2019-07-31 NOTE — PROGRESS NOTES
Mr. Ant Payan is a 80 y.o.  male with history of Afib who presents today for anticoagulation monitoring and adjustment. Patient verifies current dosing regimen  Patient denies s/s bleeding/bruising/swelling/SOB  No blood in urine or stool. No dietary changes. No changes in medication/OTC agents/Herbals. No change in alcohol use. No missed doses. No Procedures scheduled in the future at this time. Lab Results   Component Value Date    INR 4.3 07/31/2019    INR 2.5 06/26/2019    INR 2.6 05/29/2019       Pertinent findings: He is currently on a 7 day course of Macrobid for a UTI. He has 3 days left to take. He has been taking some tramadol for shoulder pain as well. His INR is quite elevated today at 4.3, most likely due to the [facility], although it usually has a low potential for interaction with the warfarin. He fell today, just prior to this appointment. He cut the top of his hand. The bleeding has now stopped. He did not hit his head. For INR of 4.3, we will hold his warfarin on Thursday and Friday, since he took it this morning. He will then resume his previous dosing, since he will be finished with the antibiotic and the tramadol. He will seek immediate medical attention for any significant bleeding. He will return in 2 weeks for his next INR. Warfarin dosing: Hold warfarin on Thursday AND Friday ONLY then   CONTINUE:  Warfarin 4 mg daily     After visit summary printed and reviewed with patient.

## 2019-08-14 ENCOUNTER — ANTI-COAG VISIT (OUTPATIENT)
Dept: PHARMACY | Age: 84
End: 2019-08-14
Payer: MEDICARE

## 2019-08-14 LAB — INTERNATIONAL NORMALIZATION RATIO, POC: 2.8

## 2019-08-14 PROCEDURE — 99211 OFF/OP EST MAY X REQ PHY/QHP: CPT

## 2019-08-14 PROCEDURE — 85610 PROTHROMBIN TIME: CPT

## 2019-08-26 ENCOUNTER — TELEPHONE (OUTPATIENT)
Dept: PHARMACY | Age: 84
End: 2019-08-26

## 2019-09-11 ENCOUNTER — ANTI-COAG VISIT (OUTPATIENT)
Dept: PHARMACY | Age: 84
End: 2019-09-11
Payer: MEDICARE

## 2019-09-11 LAB — INTERNATIONAL NORMALIZATION RATIO, POC: 3.4

## 2019-09-11 PROCEDURE — 85610 PROTHROMBIN TIME: CPT

## 2019-09-11 PROCEDURE — 99211 OFF/OP EST MAY X REQ PHY/QHP: CPT

## 2019-09-27 ENCOUNTER — ANTI-COAG VISIT (OUTPATIENT)
Dept: PHARMACY | Age: 84
End: 2019-09-27
Payer: MEDICARE

## 2019-09-27 DIAGNOSIS — I48.91 ATRIAL FIBRILLATION, UNSPECIFIED TYPE (HCC): ICD-10-CM

## 2019-09-27 LAB — INTERNATIONAL NORMALIZATION RATIO, POC: 2.9

## 2019-09-27 PROCEDURE — 99211 OFF/OP EST MAY X REQ PHY/QHP: CPT

## 2019-09-27 PROCEDURE — 85610 PROTHROMBIN TIME: CPT

## 2019-10-14 RX ORDER — LABETALOL 100 MG/1
TABLET, FILM COATED ORAL
Qty: 90 TABLET | Refills: 4 | Status: SHIPPED | OUTPATIENT
Start: 2019-10-14

## 2019-10-14 RX ORDER — WARFARIN SODIUM 4 MG/1
TABLET ORAL
Qty: 90 TABLET | Refills: 4 | Status: SHIPPED | OUTPATIENT
Start: 2019-10-14

## 2019-10-23 ENCOUNTER — ANTI-COAG VISIT (OUTPATIENT)
Dept: PHARMACY | Age: 84
End: 2019-10-23
Payer: MEDICARE

## 2019-10-23 DIAGNOSIS — I48.91 ATRIAL FIBRILLATION, UNSPECIFIED TYPE (HCC): ICD-10-CM

## 2019-10-23 LAB — INTERNATIONAL NORMALIZATION RATIO, POC: 2.5

## 2019-10-23 PROCEDURE — 85610 PROTHROMBIN TIME: CPT

## 2019-10-23 PROCEDURE — 99211 OFF/OP EST MAY X REQ PHY/QHP: CPT

## 2019-11-20 ENCOUNTER — ANTI-COAG VISIT (OUTPATIENT)
Dept: PHARMACY | Age: 84
End: 2019-11-20
Payer: MEDICARE

## 2019-11-20 DIAGNOSIS — I48.91 ATRIAL FIBRILLATION, UNSPECIFIED TYPE (HCC): ICD-10-CM

## 2019-11-20 LAB — INTERNATIONAL NORMALIZATION RATIO, POC: 3.5

## 2019-11-20 PROCEDURE — 85610 PROTHROMBIN TIME: CPT

## 2019-11-20 PROCEDURE — 99211 OFF/OP EST MAY X REQ PHY/QHP: CPT

## 2019-12-06 ENCOUNTER — ANTI-COAG VISIT (OUTPATIENT)
Dept: PHARMACY | Age: 84
End: 2019-12-06
Payer: MEDICARE

## 2019-12-06 DIAGNOSIS — I48.91 ATRIAL FIBRILLATION, UNSPECIFIED TYPE (HCC): ICD-10-CM

## 2019-12-06 LAB — INTERNATIONAL NORMALIZATION RATIO, POC: 2.6

## 2019-12-06 PROCEDURE — 85610 PROTHROMBIN TIME: CPT

## 2019-12-06 PROCEDURE — 99211 OFF/OP EST MAY X REQ PHY/QHP: CPT

## 2020-01-06 ENCOUNTER — TELEPHONE (OUTPATIENT)
Dept: PHARMACY | Age: 85
End: 2020-01-06

## 2020-01-13 RX ORDER — SIMVASTATIN 20 MG
TABLET ORAL
Qty: 90 TABLET | Refills: 4 | Status: SHIPPED | OUTPATIENT
Start: 2020-01-13